# Patient Record
Sex: FEMALE | URBAN - METROPOLITAN AREA
[De-identification: names, ages, dates, MRNs, and addresses within clinical notes are randomized per-mention and may not be internally consistent; named-entity substitution may affect disease eponyms.]

---

## 2023-07-27 DIAGNOSIS — Z12.11 COLON CANCER SCREENING: Primary | ICD-10-CM

## 2023-09-08 ENCOUNTER — TELEPHONE (OUTPATIENT)
Dept: GASTROENTEROLOGY | Facility: CLINIC | Age: 53
End: 2023-09-08
Payer: COMMERCIAL

## 2023-09-08 NOTE — TELEPHONE ENCOUNTER
----- Message from Elke Jackson sent at 8/29/2023 10:45 AM CDT -----  Regarding: NBP - Direct Schedule New Pt NBP  Direct Schedule - New Pt NBP - last colon 11.2019 [facility] ..... [facility] at [phone] called to check status. Told in que to be called.     Please call pt to schedule.    Thanks,   Elke

## 2023-11-16 VITALS — HEIGHT: 61 IN | BODY MASS INDEX: 55.32 KG/M2 | WEIGHT: 293 LBS

## 2023-11-16 DIAGNOSIS — Z86.010 HISTORY OF COLON POLYPS: Primary | ICD-10-CM

## 2023-11-16 DIAGNOSIS — Z12.11 SCREENING FOR COLON CANCER: ICD-10-CM

## 2023-11-16 RX ORDER — ATORVASTATIN CALCIUM 40 MG/1
1 TABLET, FILM COATED ORAL DAILY
COMMUNITY

## 2023-11-16 RX ORDER — PAROXETINE HYDROCHLORIDE 40 MG/1
1 TABLET, FILM COATED ORAL DAILY
COMMUNITY
Start: 2023-11-03

## 2023-11-16 RX ORDER — SOD SULF/POT CHLORIDE/MAG SULF 1.479 G
12 TABLET ORAL DAILY
Qty: 24 TABLET | Refills: 0 | Status: CANCELLED | OUTPATIENT
Start: 2023-11-16

## 2023-11-16 RX ORDER — HYDROCHLOROTHIAZIDE 25 MG/1
1 TABLET ORAL DAILY
COMMUNITY

## 2023-11-16 NOTE — TELEPHONE ENCOUNTER
Patient's last colonoscopy was w/ Dr. Mortensen on 11/21/2019. Internal hemorrhoids. 3 mm colon polyp removed by cold snare polypectomy. Per Dr. Mortensen's report.     Patient denied having any current problems or issues. Also denied having any hx of seizures or kidney disease. Patient has sleep apnea and uses a cpap machine at night. Settings are unknown.     Chart was reviewed and updated with patient. Prep instructions were also reviewed and sent to patient's email at [email or web address].     Colonoscopy scheduled w/ NBP at [facility] on Monday March 18, 2024. - dmp

## 2023-11-29 NOTE — TELEPHONE ENCOUNTER
Patient was notified and voiced understanding. Procedure scheduled on 3/18/24 w/ nbp has been cancelled. - dmp

## 2024-11-20 ENCOUNTER — TELEPHONE (OUTPATIENT)
Dept: GASTROENTEROLOGY | Facility: CLINIC | Age: 54
End: 2024-11-20
Payer: COMMERCIAL

## 2024-11-20 VITALS — HEIGHT: 61 IN | WEIGHT: 293 LBS | BODY MASS INDEX: 55.32 KG/M2

## 2024-11-20 DIAGNOSIS — Z86.0100 HISTORY OF COLON POLYPS: Primary | ICD-10-CM

## 2024-11-20 DIAGNOSIS — Z12.11 SCREENING FOR COLON CANCER: ICD-10-CM

## 2024-11-20 NOTE — TELEPHONE ENCOUNTER
S/w pt and told her that I was calling as a courtesy regarding up coming Colon with NBP on 12/3/24, Tuesday and wanted to verify that she has her paper prep instructions and meds.  Pt stated she has both.  I also mentioned that [facility] will call the day before (Monday) with the arrival time, GI Lab is located on the third floor, and to pre-register anytime next week. john

## 2024-11-20 NOTE — TELEPHONE ENCOUNTER
"[facility]  [address] [place] [address]  Phone: [phone]  Fax: [phone]    History & Physical         Provider: Dr. Marilu Chavez    Patient Name: Chana CAMPA (age):1970  54 y.o.           Gender: female   Phone: [phone]     Referring Physician: Jazzmine Stroud     Vital Signs:   Height - 5' 1"  Weight - 312 lb  BMI -  58.95    [facility]    Encounter Diagnoses   Name Primary?    History of colon polyps Yes    Screening for colon cancer            History:      Past Medical History:   Diagnosis Date    Acid reflux     Anxiety     BMI 57.06 2023    Depression     Hiatal hernia     High cholesterol     Hypertension     Kidney stones     Personal history of colonic polyps     Sleep apnea with use of continuous positive airway pressure (CPAP)       Past Surgical History:   Procedure Laterality Date    COLONOSCOPY  2019    ESOPHAGOGASTRODUODENOSCOPY  2018    KIDNEY STONE SURGERY  2018    stone removal - left kidney    TUBAL LIGATION  1998      Medication List with Changes/Refills   Current Medications    AMLODIPINE (NORVASC) 5 MG TABLET    TAKE 1 TABLET EVERY DAY BY ORAL ROUTE AS DIRECTED, FOR BLOOD PRESSURE.    ATORVASTATIN (LIPITOR) 40 MG TABLET    Take 1 tablet by mouth once daily.    HYDROCHLOROTHIAZIDE (HYDRODIURIL) 25 MG TABLET    Take 1 tablet by mouth once daily.    PAROXETINE (PAXIL) 40 MG TABLET    Take 1 tablet by mouth once daily.    SOD SULF-POT CHLORIDE-MAG SULF (SUTAB) 1.479-0.188- 0.225 GRAM TABLET    Take according to package instructions with indicated amount of water. No breakfast day before test. May substitute with Suprep, Clenpiq, Plenvu, Moviprep or GoLytely based on Rx plan and patient preference.      Review of patient's allergies indicates:   Allergen Reactions    Lidocaine Anaphylaxis      Family History   Problem " Relation Name Age of Onset    Breast cancer Mother      Heart attack Father        Social History     Tobacco Use    Smoking status: Never    Smokeless tobacco: Never   Substance Use Topics    Alcohol use: Never    Drug use: Never        Physical Examination:     General Appearance:___________________________  HEENT: _____________________________________  Abdomen:____________________________________  Heart:________________________________________  Lungs:_______________________________________  Extremities:___________________________________  Skin:_________________________________________  Endocrine:____________________________________  Genitourinary:_________________________________  Neurological:__________________________________      Patient has been evaluated immediately prior to sedation and is medically cleared for endoscopy with IVCS as an ASA class: ______      Physician Signature: _________________________       Date: ________  Time: ________

## 2024-12-03 ENCOUNTER — OUTSIDE PLACE OF SERVICE (OUTPATIENT)
Dept: GASTROENTEROLOGY | Facility: CLINIC | Age: 54
End: 2024-12-03

## 2024-12-03 LAB — CRC RECOMMENDATION EXT: NORMAL

## 2024-12-03 PROCEDURE — 45380 COLONOSCOPY AND BIOPSY: CPT | Mod: 33,59,, | Performed by: INTERNAL MEDICINE

## 2024-12-03 PROCEDURE — 45385 COLONOSCOPY W/LESION REMOVAL: CPT | Mod: 33,,, | Performed by: INTERNAL MEDICINE

## 2024-12-09 ENCOUNTER — TELEPHONE (OUTPATIENT)
Dept: GASTROENTEROLOGY | Facility: CLINIC | Age: 54
End: 2024-12-09
Payer: COMMERCIAL

## 2024-12-10 NOTE — TELEPHONE ENCOUNTER
1 hyp, 1 benign, repeat colonoscopy in 5 years.     Notify patient. Confirm recall tab in appointment desk is up-to-date (update if needed).  NBP

## 2025-01-10 ENCOUNTER — DOCUMENTATION ONLY (OUTPATIENT)
Dept: GASTROENTEROLOGY | Facility: CLINIC | Age: 55
End: 2025-01-10
Payer: COMMERCIAL

## 2025-03-24 ENCOUNTER — TELEPHONE (OUTPATIENT)
Dept: GASTROENTEROLOGY | Facility: CLINIC | Age: 55
End: 2025-03-24
Payer: COMMERCIAL

## 2025-03-24 NOTE — TELEPHONE ENCOUNTER
----- Message from Nadia sent at 3/24/2025  8:47 AM CDT -----  Contact: BRADLEY SANDERS [[id number]]  ...Type:  Patient Requesting Appointment Who Called: BRADLEY SANDERS [[id number]]Symptoms?: got a letter in the mail to schedule colonoscopy.When they would like to be seen?  Would the patient rather a call back or a response via [facility]?  [facility] Call Back Number: [phone] (home) Additional Information:

## 2025-03-24 NOTE — TELEPHONE ENCOUNTER
----- Message from Ari sent at 3/24/2025  9:01 AM CDT -----  Contact: BRADLEY SANDERS [[id number]]  ..Type:  Patient Requesting CallWho Called:BRADLEY SANDERS [[id number]]Does the patient know what this is regarding?:got letter in the mail about its time for her colonoscopy Would the patient rather a call back or a response via [facility]? [facility] Call Back Number:[phone] (home) Additional Information: had colonoscopy done 12/3/24
